# Patient Record
Sex: FEMALE | Race: WHITE
[De-identification: names, ages, dates, MRNs, and addresses within clinical notes are randomized per-mention and may not be internally consistent; named-entity substitution may affect disease eponyms.]

---

## 2018-05-02 ENCOUNTER — HOSPITAL ENCOUNTER (INPATIENT)
Dept: HOSPITAL 56 - MW.OBCHECK | Age: 38
LOS: 4 days | Discharge: HOME | DRG: 540 | End: 2018-05-06
Attending: OBSTETRICS & GYNECOLOGY | Admitting: OBSTETRICS & GYNECOLOGY
Payer: COMMERCIAL

## 2018-05-02 DIAGNOSIS — Z3A.39: ICD-10-CM

## 2018-05-02 DIAGNOSIS — O40.3XX0: Primary | ICD-10-CM

## 2018-05-02 DIAGNOSIS — O09.523: ICD-10-CM

## 2018-05-02 PROCEDURE — 4A1H7CZ MONITORING OF PRODUCTS OF CONCEPTION, CARDIAC RATE, VIA NATURAL OR ARTIFICIAL OPENING: ICD-10-PCS | Performed by: OBSTETRICS & GYNECOLOGY

## 2018-05-02 PROCEDURE — 3E0P7VZ INTRODUCTION OF HORMONE INTO FEMALE REPRODUCTIVE, VIA NATURAL OR ARTIFICIAL OPENING: ICD-10-PCS | Performed by: OBSTETRICS & GYNECOLOGY

## 2018-05-02 RX ADMIN — ONDANSETRON PRN MG: 2 INJECTION, SOLUTION INTRAMUSCULAR; INTRAVENOUS at 23:06

## 2018-05-02 RX ADMIN — MISOPROSTOL PRN MCG: 100 TABLET ORAL at 15:33

## 2018-05-02 RX ADMIN — MISOPROSTOL PRN MCG: 100 TABLET ORAL at 21:30

## 2018-05-02 NOTE — PCM.PREANE
Preanesthetic Assessment





- Anesthesia/Transfusion/Family Hx


Anesthesia History: Prior Anesthesia Without Reaction


Other Type of Anesthesia Reaction Comment: nausea and vomiting


Transfusion History: Prior Transfusion Without Reaction





- Review of Systems


General: No Symptoms


Pulmonary: No Symptoms


Cardiovascular: No Symptoms


Gastrointestinal: No Symptoms


Neurological: No Symptoms


Other: Reports: None





- Physical Assessment


Height: 5 ft 4.17 in


Weight: 88.541 kg


ASA Class: 2


Mental Status: Alert & Oriented x3


Airway Class: Mallampati = 2


Dentition: Reports: Normal Dentition


Thyro-Mental Finger Breadths: 3


Mouth Opening Finger Breadths: 3


ROM/Head Extension: Full


Lungs: Clear to Auscultation, Normal Respiratory Effort


Cardiovascular: Regular Rate, Regular Rhythm





- Lab


Values: 





 Laboratory Last Values











WBC  6.76 K/uL (4.0-11.0)   18  11:17    


 


RBC  3.75 M/uL (4.30-5.90)  L  18  11:17    


 


Hgb  12.0 g/dL (12.0-16.0)   18  11:17    


 


Hct  35.8 % (36.0-46.0)  L  18  11:17    


 


MCV  95.5 fL (80.0-98.0)   18  11:17    


 


MCH  32.0 pg (27.0-32.0)   18  11:17    


 


MCHC  33.5 g/dL (31.0-37.0)   18  11:17    


 


RDW Std Deviation  50.9 fl (28.0-62.0)   18  11:17    


 


RDW Coeff of Nikki  15 % (11.0-15.0)   18  11:17    


 


Plt Count  111 K/uL (150-400)  L  18  11:17    


 


MPV  12.70 fL (7.40-12.00)  H  18  11:17    


 


Nucleated RBC %  0.3 /100WBC  18  11:17    


 


Nucleated RBCs #  0 K/uL  18  11:17    


 


Urine Color  YELLOW   18  13:00    


 


Urine Appearance  CLEAR   18  13:00    


 


Urine pH  7.0  (5.0-8.0)   18  13:00    


 


Ur Specific Gravity  1.020  (1.001-1.035)   18  13:00    


 


Urine Protein  NEGATIVE mg/dL (NEGATIVE)   18  13:00    


 


Urine Glucose (UA)  NEGATIVE mg/dL (NEGATIVE)   18  13:00    


 


Urine Ketones  NEGATIVE mg/dL (NEGATIVE)   18  13:00    


 


Urine Occult Blood  NEGATIVE  (NEGATIVE)   18  13:00    


 


Urine Nitrite  NEGATIVE  (NEGATIVE)   18  13:00    


 


Urine Bilirubin  NEGATIVE  (NEGATIVE)   18  13:00    


 


Urine Urobilinogen  0.2 EU/dL (<2.0)   18  13:00    


 


Ur Leukocyte Esterase  NEGATIVE  (NEGATIVE)   18  13:00    


 


Blood Type  O POSITIVE   18  11:17    


 


Antibody Screen  NEGATIVE   18  11:17    














- Allergies


Allergies/Adverse Reactions: 


 Allergies











Allergy/AdvReac Type Severity Reaction Status Date / Time


 


Penicillins Allergy  Rash Verified 18 17:47














- Acknowledgements


Anesthesia Type Planned: Epidural


Pt an Appropriate Candidate for the Planned Anesthesia: Yes


Alternatives and Risks of Anesthesia Discussed w Pt/Guardian: Yes


Pt/Guardian Understands and Agrees with Anesthesia Plan: Yes





PreAnesthesia Questionnaire


HEENT History: Reports: None


Cardiovascular History: Reports: None


Respiratory History: Reports: None


Gastrointestinal History: Reports: GERD


Genitourinary History: Reports: None


OB/GYN History: Reports: Ectopic Pregnancy, Pregnancy


: 6


Para: 0


LMP (Approximate): Pregnant


Other OB/BYN History: IVF x 2


Musculoskeletal History: Reports: None


Neurological History: Reports: None


Psychiatric History: Reports: Anxiety


Endocrine/Metabolic History: Reports: Obesity/BMI 30+


Other Hematologic History: blood transfusion following ectopic pregnancy


Immunologic History: Reports: None


Oncologic (Cancer) History: Reports: None


Other Dermatologic History: hx I&D for leg trauma





- Infectious Disease History


Infectious Disease History: Reports: Chicken Pox





- Past Surgical History


HEENT Surgical History: Reports: Adenoidectomy, Tonsillectomy


GI Surgical History: Reports: None, Hernia, Inguinal


Female  Surgical History: Reports: D&C, Salpingo-Oophorectomy (Rt Side)


Musculoskeletal Surgical History: Reports: Amputation (traumatic amputation of 

left pinky finger)





- SUBSTANCE USE


Smoking Status *Q: Never Smoker


Tobacco Use Within Last Twelve Months: No


Recreational Drug Use History: No





- HOME MEDS


Home Medications: 


 Home Meds





PNV95/Ferrous Fumarate/FA [Prenatal Multivitamins] 1 tab PO DAILY 07/21/15 [

History]


Aspirin 81 mg PO DAILY 18 [History]


Docusate Sodium [Colace] 100 mg PO BID 18 [History]











- CURRENT (IN HOUSE) MEDS


Current Meds: 





 Current Medications





Butorphanol Tartrate (Stadol)  1 mg IVPUSH Q1H PRN


   PRN Reason: Pain


Carboprost Tromethamine (Hemabate Ds)  250 mcg IM ASDIRECTED PRN


   PRN Reason: Post Partum Hemorrhage


Lactated Ringer's (Ringers, Lactated)  1,000 mls @ 150 mls/hr IV ASDIRECTED [facility]


   Last Admin: 18 11:42 Dose:  150 mls/hr


Tranexamic Acid 1,000 mg/ (Sodium Chloride)  110 mls @ 660 mls/hr IV ONETIME PRN


   PRN Reason: Bleeding


Oxytocin/Sodium Chloride (Oxytocin 30 Unit/500 Ml-Ns)  30 unit in 500 mls @ 2 

mls/hr IV TITRATE [facility]; Protocol


Ampicillin Sodium 1 gm/ Sodium (Chloride)  50 mls @ 100 mls/hr IV Q4H [facility]


   Last Admin: 18 17:00 Dose:  100 mls/hr


Lidocaine HCl (Xylocaine 1%)  50 ml INJECT .ONCE PRN


   PRN Reason: Laceration repair


Methylergonovine Maleate (Methergine)  0.2 mg IM ASDIRECTED PRN


   PRN Reason: Post Partum Hemorrhage


Misoprostol (Cytotec)  200 mcg PO .ONCE PRN


   PRN Reason: Post Partum Hemorrhage


Misoprostol (Cytotec)  25 mcg VAG .ONCE [facility]


   Last Admin: 18 11:32 Dose:  25 mcg


Misoprostol (Cytotec)  25 mcg VAG Q4H PRN


   PRN Reason: Cervical Ripening


   Last Admin: 18 15:33 Dose:  25 mcg


Nalbuphine HCl (Nubain)  10 mg IVPUSH Q1H PRN


   PRN Reason: Pain (severe 7-10)


Sodium Chloride (Saline Flush)  10 ml FLUSH ASDIRECTED PRN


   PRN Reason: Keep Vein Open


Sodium Chloride (Saline Flush)  2.5 ml FLUSH ASDIRECTED PRN


   PRN Reason: Keep Vein Open


Sterile Water (Sterile Water For Irrigation)  1,000 ml IRR ASDIRECTED PRN


   PRN Reason: delivery


Terbutaline Sulfate (Brethine)  0.25 mg SUBCUT ASDIRECTED PRN


   PRN Reason: Tacysystole





Discontinued Medications





Ampicillin Sodium 2 gm/ Sodium (Chloride)  100 mls @ 200 mls/hr IV ONETIME ONE


   Stop: 18 11:28


   Last Admin: 18 11:39 Dose:  200 mls/hr

## 2018-05-03 RX ADMIN — SODIUM CITRATE AND CITRIC ACID MONOHYDRATE ONE ML: 500; 334 SOLUTION ORAL at 19:28

## 2018-05-03 RX ADMIN — ONDANSETRON PRN MG: 2 INJECTION, SOLUTION INTRAMUSCULAR; INTRAVENOUS at 05:21

## 2018-05-03 RX ADMIN — KETOROLAC TROMETHAMINE SCH MG: 30 INJECTION, SOLUTION INTRAMUSCULAR at 21:58

## 2018-05-03 RX ADMIN — SODIUM CITRATE AND CITRIC ACID MONOHYDRATE ONE ML: 500; 334 SOLUTION ORAL at 09:05

## 2018-05-03 NOTE — PCM.OPNOTE
- General Post-Op/Procedure Note


Date of Surgery/Procedure: 05/03/18


Operative Procedure(s): Primary LTCS


Findings: 


viable male, apgars 8 & 9, weight:  3370 g, nuchal cord x 1 reduced, delivery 

of intact 3V cord & placenta.





Pre Op Diagnosis: 39/1 weeks IUP.  IOL for polyhydramnios, AMA.  Arrest of 

dilation


Post-Op Diagnosis: same


Anesthesia Technique: Epidural, General ET Tube


Primary Surgeon: Mireya Guy


Assistant: Lavonne Lee


Fluid Replacement, Intraop: 1,800


Output, Urine Amount: 75


EBL in mLs: 600


Complications: None known


Condition: Stable


Free Text/Narrative:: 


Dictation:  [id number]

## 2018-05-03 NOTE — PCM.PRNOTE
- Free Text/Narrative


Note: 





Anes Note





Patient reports incomplete analgesia.





Patient reports 7 out of 10 pain in the perineum, and last vaginal check was 

uncomfortable.





Patient has delivered a PCA bolus using her epidural.





Tariq Romero CRNA

## 2018-05-03 NOTE — PCM.POSTAN
POST ANESTHESIA ASSESSMENT





- MENTAL STATUS


Mental Status: Alert, Oriented





- VITAL SIGNS


Pulse Rate: 94


SaO2: 97


Resp Rate: 19


Blood Pressure: 123/66


Temperature: 98.4 F





- RESPIRATORY


Respiratory Status: Respiratory Rate WNL, Airway Patent, O2 Saturation Stable





- CARDIOVASCULAR


CV Status: Pulse Rate WNL, Blood Pressure Stable





- GASTROINTESTINAL


GI Status: No Symptoms





- POST OP HYDRATION


Hydration Status: Adequate & Stable





- OBSERVATIONS


Free Text/Narrative:: 





Sleeping when no activity or touch. Vitals strong and pain absent. She 

understands  son is in good condition.

## 2018-05-03 NOTE — PCM.PRNOTE
- Free Text/Narrative


Note: 





Anes Note





Patient reports loss of all anesthesia. 





Epidural cather removed easily and complete.





Epidural replaced with new catheter.





Bet prep X 3.





Sterile Drape applied. Level L2-L3. Midline approach.





Local 1% lido.





Epidural space easily achieved using HUMBERTO technique.





Cath threaded 4 cm with ease. Sterile dressing applied.





Test 3 cc 1.5% lido with epi negative.





Load 10 cc 0.25% bupivicaine in slow incremental doses.





Patient reports excellent analgesia.





Tariq Romero CRNA

## 2018-05-04 RX ADMIN — OXYCODONE HYDROCHLORIDE AND ACETAMINOPHEN PRN TAB: 5; 325 TABLET ORAL at 21:12

## 2018-05-04 RX ADMIN — OXYCODONE HYDROCHLORIDE AND ACETAMINOPHEN PRN TAB: 5; 325 TABLET ORAL at 00:51

## 2018-05-04 RX ADMIN — OXYCODONE HYDROCHLORIDE AND ACETAMINOPHEN PRN TAB: 5; 325 TABLET ORAL at 16:12

## 2018-05-04 RX ADMIN — OXYCODONE HYDROCHLORIDE AND ACETAMINOPHEN PRN TAB: 5; 325 TABLET ORAL at 05:22

## 2018-05-04 RX ADMIN — KETOROLAC TROMETHAMINE SCH MG: 30 INJECTION, SOLUTION INTRAMUSCULAR at 10:32

## 2018-05-04 RX ADMIN — OXYCODONE HYDROCHLORIDE AND ACETAMINOPHEN PRN TAB: 5; 325 TABLET ORAL at 11:49

## 2018-05-04 RX ADMIN — KETOROLAC TROMETHAMINE SCH MG: 30 INJECTION, SOLUTION INTRAMUSCULAR at 22:35

## 2018-05-04 RX ADMIN — KETOROLAC TROMETHAMINE SCH MG: 30 INJECTION, SOLUTION INTRAMUSCULAR at 04:44

## 2018-05-04 RX ADMIN — KETOROLAC TROMETHAMINE SCH MG: 30 INJECTION, SOLUTION INTRAMUSCULAR at 16:48

## 2018-05-04 NOTE — PCM.PNPP
- General Info


Date of Service: 18


Functional Status: Reports: Pain Controlled, Tolerating Diet, Ambulating





- Review of Systems


General: Denies: Fever


Pulmonary: Reports: Cough (stable, receive zithromax earlier this week).  Denies

: Shortness of Breath


Cardiovascular: Denies: Chest Pain, Palpitations, Lightheadedness


Gastrointestinal: Reports: Flatus.  Denies: Nausea


Genitourinary: Denies: Flank Pain


Neurological: Reports: No Symptoms


Psychiatric: Reports: No Symptoms





- General Info


Date of Service: 18





- Patient Data


Vital Signs - Most Recent: 


 Last Vital Signs











Temp  37.0 C   18 03:00


 


Pulse  79   18 03:00


 


Resp  16   18 04:00


 


BP  122/78   18 03:00


 


Pulse Ox  97   18 04:00











Weight - Most Recent: 88.541 kg


I&O - Last 24 Hours: 


 Intake & Output











 18





 14:59 22:59 06:59


 


Intake Total  4000 


 


Output Total  270 


 


Balance  3730 











Lab Results - Last 24 Hours: 


 Laboratory Results - last 24 hr











  18 Range/Units





  19:50 


 


Cord ABG pH  7.305  (7.18-7.38)  


 


Cord ABG Base Excess  -5  (-10--2)  


 


Cord VBG pH  7.331  (7.25-7.45)  


 


Cord VBG Base Excess  -6  (-10--2)  











Med Orders - Current: 


 Current Medications





Al Hydroxide/Mg Hydroxide (Mag-Al Plus)  30 ml PO Q8H PRN


   PRN Reason: Heartburn


Bisacodyl (Dulcolax)  10 mg RECTAL .ONCE PRN


   PRN Reason: Constipation


Carboprost Tromethamine (Hemabate Ds)  250 mcg IM ASDIRECTED PRN


   PRN Reason: Post Partum Hemorrhage


Diphenhydramine HCl (Benadryl)  25 mg IVPUSH Q6H PRN


   PRN Reason: Itching or Nausea


Docusate Sodium (Colace)  100 mg PO BID [facility]


Emollient Ointment (Lansinoh Hpa)  0 gm TOP ASDIRECTED PRN


   PRN Reason: Sore Nipples


Furosemide (Lasix)  20 mg PO DAILY [facility]


Lactated Ringer's (Ringers, Lactated)  1,000 mls @ 150 mls/hr IV ASDIRECTED [facility]


   Last Admin: 18 18:40 Dose:  150 mls/hr


Tranexamic Acid 1,000 mg/ (Sodium Chloride)  110 mls @ 660 mls/hr IV ONETIME PRN


   PRN Reason: Bleeding


Oxytocin/Sodium Chloride (Oxytocin 30 Unit/500 Ml-Ns)  30 unit in 500 mls @ 2 

mls/hr IV TITRATE [facility]; Protocol


   Last Titration: 18 17:26 Dose:  0 munits/min, 0 mls/hr


Lactated Ringer's (Ringers, Lactated)  1,000 mls @ 125 mls/hr IV ASDIRECTED [facility]


   Last Admin: 18 22:02 Dose:  125 mls/hr


Ibuprofen (Motrin)  800 mg PO Q8H PRN


   PRN Reason: mild pain or fever


Ketorolac Tromethamine (Toradol)  30 mg IVPUSH Q6H [facility]


   Stop: 18 22:01


   Last Admin: 18 04:44 Dose:  30 mg


Methylergonovine Maleate (Methergine)  0.2 mg IM ASDIRECTED PRN


   PRN Reason: Post Partum Hemorrhage


Misoprostol (Cytotec)  200 mcg PO .ONCE PRN


   PRN Reason: Post Partum Hemorrhage


Nalbuphine HCl (Nubain)  10 mg IVPUSH Q3H PRN


   PRN Reason: Pruritis


   Stop: 18 20:51


Naloxone HCl (Narcan)  0.1 mg IVPUSH ONETIME PRN


   PRN Reason: Respiratory Depression


   Stop: 18 20:51


Ondansetron HCl (Zofran)  4 mg IVPUSH Q6H PRN


   PRN Reason: Nausea/Vomiting


   Last Admin: 18 05:21 Dose:  4 mg


Ondansetron HCl (Zofran)  4 mg IVPUSH Q4H PRN


   PRN Reason: Nausea/Vomiting


Oxycodone/Acetaminophen (Percocet 325-5 Mg)  1 tab PO Q4H PRN


   PRN Reason: Pain (moderate 4-6)


   Last Admin: 18 00:51 Dose:  1 tab


Oxycodone/Acetaminophen (Percocet 325-5 Mg)  2 tab PO Q4H PRN


   PRN Reason: Pain (moderate 4-6)


Promethazine HCl (Phenergan)  25 mg IM Q6H PRN


   PRN Reason: Nausea


   Last Admin: 18 08:42 Dose:  25 mg


Simethicone (Simethicone)  80 mg PO Q4H PRN


   PRN Reason: Gas


Sodium Chloride (Saline Flush)  10 ml FLUSH ASDIRECTED PRN


   PRN Reason: Keep Vein Open


Sodium Chloride (Saline Flush)  2.5 ml FLUSH ASDIRECTED PRN


   PRN Reason: Keep Vein Open


Terbutaline Sulfate (Brethine)  0.25 mg SUBCUT ASDIRECTED PRN


   PRN Reason: Tacysystole


   Last Admin: 18 19:14 Dose:  0.25 mg





Discontinued Medications





Bupivacaine HCl (Sensorcaine-Mpf 0.5%) Confirm Administered Dose 10 ml .ROUTE 

.STK-MED ONE


   Stop: 18 08:10


Bupivacaine HCl (Sensorcaine-Mpf 0.5%) Confirm Administered Dose 10 ml .ROUTE 

.STK-MED ONE


   Stop: 18 11:47


Bupivacaine HCl (Sensorcaine-Mpf 0.25%) Confirm Administered Dose 10 ml .ROUTE 

.STK-MED ONE


   Stop: 18 16:20


Butorphanol Tartrate (Stadol)  1 mg IVPUSH Q1H PRN


   PRN Reason: Pain


   Last Admin: 18 16:06 Dose:  1 mg


Cefazolin Sodium (Ancef) Confirm Administered Dose 2 gm .ROUTE .STK-MED ONE


   Stop: 18 19:25


Citric Acid/Sodium Citrate (Bicitra Solution)  30 ml PO ONETIME ONE


   Stop: 18 08:49


   Last Admin: 18 19:28 Dose:  30 ml


Citric Acid/Sodium Citrate (Bicitra Solution) Confirm Administered Dose 30 ml 

.ROUTE .STK-MED ONE


   Stop: 18 19:28


Fentanyl (Sublimaze) Confirm Administered Dose 100 mcg .ROUTE .STK-MED ONE


   Stop: 18 13:22


Fentanyl (Sublimaze) Confirm Administered Dose 100 mcg .ROUTE .STK-MED ONE


   Stop: 18 13:38


Fentanyl (Sublimaze) Confirm Administered Dose 100 mcg .ROUTE .STK-MED ONE


   Stop: 18 19:24


Ampicillin Sodium 2 gm/ Sodium (Chloride)  100 mls @ 200 mls/hr IV ONETIME ONE


   Stop: 18 11:28


   Last Admin: 18 11:39 Dose:  200 mls/hr


Ampicillin Sodium 1 gm/ Sodium (Chloride)  50 mls @ 100 mls/hr IV Q4H [facility]


   Last Admin: 18 17:20 Dose:  100 mls/hr


Fentanyl/Bupivacaine HCl (Fentanyl-Bupiv-Ns 2 Mcg/Ml-0.125%) Confirm 

Administered Dose 100 mls @ as directed EP .STK-MED ONE


   Stop: 18 22:00


Fentanyl/Bupivacaine HCl (Fentanyl-Bupiv-Ns 2 Mcg/Ml-0.125%) Confirm 

Administered Dose 100 mls @ as directed EP .STK-MED ONE


   Stop: 18 06:51


Fentanyl/Bupivacaine HCl (Fentanyl-Bupiv-Ns 2 Mcg/Ml-0.125%) Confirm 

Administered Dose 100 mls @ as directed EP .STK-MED ONE


   Stop: 18 12:01


Bupivacaine HCl/Epinephrine Bitart (Sensorc Mpf 0.25%-Epi 1:[id number]) Confirm 

Administered Dose 30 mls @ as directed .ROUTE .STK-MED ONE


   Stop: 18 20:15


Lidocaine HCl (Xylocaine 1%)  50 ml INJECT .ONCE PRN


   PRN Reason: Laceration repair


Misoprostol (Cytotec)  25 mcg VAG .ONCE [facility]


   Last Admin: 18 11:32 Dose:  25 mcg


Misoprostol (Cytotec)  25 mcg VAG Q4H PRN


   PRN Reason: Cervical Ripening


   Last Admin: 18 21:30 Dose:  25 mcg


Morphine Sulfate (Duramorph Pf) Confirm Administered Dose 1 mg .ROUTE .STK-MED 

ONE


   Stop: 18 19:24


Nalbuphine HCl (Nubain)  10 mg IVPUSH Q1H PRN


   PRN Reason: Pain (severe 7-10)


Ondansetron HCl (Zofran) Confirm Administered Dose 4 mg .ROUTE .STK-MED ONE


   Stop: 18 19:37


Oxytocin (Pitocin) Confirm Administered Dose 20 unit .ROUTE .STK-MED ONE


   Stop: 18 19:37


Phenylephrine HCl (Phenylephrine In Ns 100 Mcg/Ml) Confirm Administered Dose 1 

mg .ROUTE .STK-MED ONE


   Stop: 18 19:37


Propofol (Diprivan  20 Ml) Confirm Administered Dose 200 mg .ROUTE .STK-MED ONE


   Stop: 18 19:38


Ropivacaine (Naropin 0.2%) Confirm Administered Dose 20 ml .ROUTE .STK-MED ONE


   Stop: 18 13:22


Sterile Water (Sterile Water For Irrigation)  1,000 ml IRR ASDIRECTED PRN


   PRN Reason: delivery


Succinylcholine Chloride (Quelicin) Confirm Administered Dose 200 mg .ROUTE .STK

-MED ONE


   Stop: 18 20:07











- Infant Interaction


Infant Disposition, Postpartum: [place] in Room with Family


Infant Feeding:  Infant; Nursed Well


Support Person: 





- Postpartum Recovery Exam


Fundal Tone: Firm


Fundal Level: At Umbilicus


Fundal Placement: Midline


Lochia Amount: Scant


Lochia Color: Rubra/Red


Bladder Status: Indwelling Catheter in Place





- Exam


General: Alert, Oriented


Lungs: Normal Respiratory Effort


Cardiovascular: Regular Rate, Regular Rhythm


GI/Abdominal Exam: Normal Bowel Sounds, Soft


Extremities: Pedal Edema (3+).  No: Armida's Sign


Skin: Warm, Dry, Intact


Wound/Incisions: Dressing Dry and Intact


Psy/Mental Status: Alert, Normal Affect





- Problem List & Annotations


(1)  delivery, delivered, current hospitalization


SNOMED Code(s): 292421442


   Code(s): O82 - ENCOUNTER FOR  DELIVERY WITHOUT INDICATION   Status: 

Acute   Current Visit: Yes   





- Problem List Review


Problem List Initiated/Reviewed/Updated: Yes





- My Orders


Last 24 Hours: 


My Active Orders





18 08:18


Promethazine [Phenergan]   25 mg IM Q6H PRN 





18 20:33


Notify Provider Intake and Out [RC] ASDIRECTED 


Notify Provider Vital Signs [RC] ASDIRECTED 


Acetaminophen/oxyCODONE [Percocet 325-5 MG]   1 tab PO Q4H PRN 


Acetaminophen/oxyCODONE [Percocet 325-5 MG]   2 tab PO Q4H PRN 


Alum Hydrox/Mag Hydrox/Simeth [Mag-Al Plus]   30 ml PO Q8H PRN 


Bisacodyl [Dulcolax]   10 mg RECTAL .ONCE PRN 


Lanolin [Lansinoh HPA]   See Dose Instructions  TOP ASDIRECTED PRN 


Ondansetron [Zofran]   4 mg IVPUSH Q4H PRN 


Simethicone   80 mg PO Q4H PRN 


diphenhydrAMINE [Benadryl]   25 mg IVPUSH Q6H PRN 


Abdominal Binder [OM.PC] Routine 


Heat Therapy [OM.PC] Routine 


Ice Therapy [OM.PC] Routine 





18 20:34


Patient Status [ADT] Routine 


Ambulate [RC] PER UNIT ROUTINE 


Communication Order [RC] PER UNIT ROUTINE 


May Shower [RC] ASDIRECTED 


RT Incentive Spirometry [RC] Q2HWA 


Vital Signs [RC] PER UNIT ROUTINE 


Assess Lochia [WOMSER] Per Unit Routine 


Assess Uterine Involution [WOMSER] Per Unit Routine 


Breast Pump [WOMSER] Per Unit Routine 


Peripheral IV Discontinue [OM.PC] Routine 


Sequential Compression Device [OM.PC] Per Unit Routine 





18 20:45


Lactated Ringers [Ringers, Lactated] 1,000 ml IV ASDIRECTED 





18 21:00


Docusate Sodium [Colace]   100 mg PO BID 





18 22:00


Ketorolac [Toradol]   30 mg IVPUSH Q6H 





18 Dinner


Regular Diet [DIET] 





18 05:11


HEMOGLOBIN/HEMATOCRIT,HH [HEME] Routine 





18 09:00


Furosemide [Lasix]   20 mg PO DAILY 





18 04:00


Ibuprofen [Motrin]   800 mg PO Q8H PRN 














- Assessment


Assessment:: 





POD 1 status post Primary LTCS





- Plan


Plan:: 





Doing well overall, ambulating halls. Pain is controlled.  Given extensive 

edema will diurese today and tomorrow with oral lasix.  Breastfeeding going 

well overall.  Continue postoperative cares.  VS are stable.

## 2018-05-04 NOTE — [id number]
Post Anesthesia Note





- EVALUATION WITHIN 48HRS OF ANESTHETIC


Vital Signs in Normal Range: Yes


Patient Participated in Evaluation: Yes


Respiratory Function Stable: Yes


Airway Patent: Yes


Cardiovascular Function Stable: Yes


Hydration Status Stable: Yes


Pain Control Satisfactory: Yes


Nausea and Vomiting Control Satisfactory: Yes


Mental Status Recovered: Yes


Pulse Rate: 94


Resp Rate: 15


Temperature: 98.4 F


Blood Pressure: 123/66





- COMMENTS/OBSERVATIONS


Free Text/Narrative:: 





Did use percocet once overnight and at 0930 today. Happy with 

outcome...discussed her emotional state and the disconnect with effect of local 

anesthetic.

## 2018-05-04 NOTE — OR
SURGEON:

Mireya Guy M.D.

 

DATE OF PROCEDURE:  2018

 

PREOPERATIVE DIAGNOSES:

1. A 39 and 1 weeks intrauterine pregnancy.

2. Induction of labor for polyhydramnios, advanced maternal age.

3. Arrest of dilation.

 

POSTOPERATIVE DIAGNOSES:

1. A 39 and 1 weeks intrauterine pregnancy.

2. Induction of labor for polyhydramnios, advanced maternal age.

3. Arrest of dilation.

 

PROCEDURES PERFORMED:

Primary low transverse  section via Pfannenstiel skin incision.

 

ANESTHESIA:

Epidural with general endotracheal anesthesia.

 

ASSISTANT:

DENVER Pickard.

 

ESTIMATED BLOOD LOSS:

600 mL.

 

FLUID:

800 mL crystalloid in OR.

 

URINE OUTPUT:

75 mL.

 

COMPLICATIONS:

None.

 

FINDINGS:

Term male, Apgar scores 8 at 1 minute, 9 at 5 minutes.  Weight of 3370 g,

intact placenta, three-vessel cord.  Nuchal cord x1 reduced manually.  Clear

amniotic fluid.  Normal-appearing pelvis.

 

DISPOSITION:

Infant to  nursery, mom in PACU, stable.

 

PROCEDURE IN DETAIL:

Catalina is a 37-year-old, G6, P0-0-5-0 at 39 and 1 weeks gestational age, who

presented yesterday for scheduled induction of labor, due to term gestation,

polyhydramnios, and advanced maternal age.  The patient underwent Cytotec dosing

and had nice cervical change with this from 1 cm thick to 3 cm, 70% by the

following day.  She had spontaneous rupture of membranes at approximately 7:00

a.m. on the morning of 2018 with clear fluid.  She had been receiving

ampicillin prophylaxis for group B beta strep positive status since she

presented and was admitted the day before.

 

The patient became increasingly uncomfortable and underwent regional anesthesia

in the form of epidural.  This worked satisfactorily initially but began having

discomfort and difficulty with pain control in the morning hours.  She had re-

boluses and re-dosing, repositioning performed.  Ultimately underwent another

epidural shortly after 1:00 p.m.  She once again became comfortable.  Fetal

heart tones remained in the 130s with variability.  Occasional variable.  The

patient now made cervical change to approximately 5 to 6 cm dilatation, 80%

effaced, -2 station.

 

Throughout the afternoon, however, the patient began having increasing

discomfort even with the second epidural in place.  Ultimately, she actually

underwent a third epidural placement at approximately 4:30 p.m.  Once again

became comfortable for short interval and then became increasingly

uncomfortable, pain of 9/10.  She was no longer able to tolerate Pitocin.  This

was discontinued, and with observation, she made no further cervical change from

6 cm dilatation.  Options at this juncture discussed extensively with Catalina 
and her

family.  She is not having good results from regional anesthesia.  She really is

not willing to undergo labor efforts and delivery without regional anesthesia.

She would like to proceed with a  at this juncture.  She has not made

any cervical change throughout the afternoon.

 

Discussed the case with Anesthesia.  Think it is most prudent to undergo general

anesthesia given the multiple regional anesthesia that have not been effective

for her pain control.

 

They are in agreement with this.  The patient did receive a dose of terbutaline

and Stadol to help with her pain control prior to going to .  The risks

of the  have been discussed with her.  Proper consent obtained.

 

The patient was taken to the operating room, where she was placed in the dorsal

supine position with leftward tilt.  SCDs to the lower extremities.  Stanley to

gravity.  She was prepped and draped in the usual sterile fashion and received

Ancef 2 g prophylactically.  Once the pediatric team was in place, the OR crew

was ready.  The patient underwent general anesthesia with intubation.  At this

juncture, created a Pfannenstiel skin incision, carried down to the level of the

rectus fascia, which was incised in midline, lateralized on either side sharply

and bluntly.  The superior aspect of fascia was tented upward, dissected sharply

and bluntly from underlying muscles.  In a similar fashion, this was performed

with the inferior aspect of fascia.  Rectus muscles were  in midline,

and peritoneum was entered.  Rectus muscles and peritoneum were lateralized

bluntly.  Self-retaining retractor gently placed.  Uterovesical reflection was

visualized.  Bladder flap was created sharply and bluntly.  Bladder was

mobilized away from lower uterine segment.  Low transverse hysterotomy was

performed.  Uterine cavities were entered bluntly with the scalpel.  The

hysterotomy was lateralized bluntly.  The clear fluid was still found to be

present.  The infant's head was flexed and delivered from the pelvis.  The

infant's head was delivered followed by anterior shoulder, posterior shoulder,

and remaining body without difficulty.  Nuchal cord x1 was reduced manually.

The infant's oropharynx and nares bulb suctioned.  Actually, the infant began

crying and cord clamped x2 and cut.  Infant was handed off to attending

pediatrician, Dr. Castillo.

 

Cord arterial, cord venous, cord blood samples were obtained.  The placenta was

now delivered.  Uterine cavity was cleared of all clot and debris.  Hysterotomy

repaired using 0 Vicryl in continuous locked fashion followed by a re-

imbricating layer.  Uterus was involuting nicely.  Upon inspection of the

hysterotomy, there was an area of oozing in the midline, repaired with figure-of
-

eight suture.  Hemostasis thereafter evident.

 

Posterior aspect of the uterus inspected.  No defects or hematomas were found to

be forming.  The region was well irrigated and suction dried.  Colonic gutters

were cleared of all clot and debris, well irrigated, suction dried.  The self-

retaining retractor gently removed.  Bladder blade was placed.  Hysterotomy was

once again inspected and found to be hemostatic.

 

The bladder blade was removed.  Rectus muscles reapproximated as well as

peritoneum with 0 Vicryl inverted mattress suture technique.  Anterior aspect of

the muscle, posterior aspect of the fascia closely inspected.  Any areas of

oozing were cauterized.  The rectus fascia was now reapproximated using 0 Vicryl

in continuous running fashion beginning laterally on either side, meeting in the

midline.  Subcutaneous tissue was well irrigated, suction dried.  Any areas of

oozing were cauterized.  There was an area of persistent bleeding along the

fascia that was repaired with figure-of-eight suture but thereafter was

hemostatic.

 

In order to help with postop pain control, the patient has had Duramorph bolused

in her epidural and also injected 20 mL of 0.25% Marcaine with epinephrine along

the incision in the fascia line.  The skin edges were now reapproximated using 3
-

0 Vicryl in a Moses needle in a subcuticular fashion and re-imbricated with 0.5

inch Steri-Strips and Mastisol.  Uterus remained firm.  Sponge, instrument, and

needle counts were correct x3.  The patient had tolerated the procedure well.

She was extubated and will be taken to PACU in stable condition.  Infant to

 nursery.

 

 

JAYESH / MEG

DD:  2018 20:46:21

DT:  2018 00:14:24

Job #:  [id number]

ELIEZER

## 2018-05-05 LAB
CHLORIDE SERPL-SCNC: 110 MMOL/L (ref 98–107)
CHLORIDE SERPL-SCNC: 111 MMOL/L (ref 98–107)
SODIUM SERPL-SCNC: 142 MMOL/L (ref 136–145)
SODIUM SERPL-SCNC: 143 MMOL/L (ref 136–145)

## 2018-05-05 RX ADMIN — OXYCODONE HYDROCHLORIDE AND ACETAMINOPHEN PRN TAB: 5; 325 TABLET ORAL at 08:43

## 2018-05-05 RX ADMIN — OXYCODONE HYDROCHLORIDE AND ACETAMINOPHEN PRN TAB: 5; 325 TABLET ORAL at 03:03

## 2018-05-05 RX ADMIN — OXYCODONE HYDROCHLORIDE AND ACETAMINOPHEN PRN TAB: 5; 325 TABLET ORAL at 20:19

## 2018-05-05 RX ADMIN — OXYCODONE HYDROCHLORIDE AND ACETAMINOPHEN PRN TAB: 5; 325 TABLET ORAL at 15:01

## 2018-05-05 RX ADMIN — ONDANSETRON PRN MG: 2 INJECTION, SOLUTION INTRAMUSCULAR; INTRAVENOUS at 07:03

## 2018-05-05 NOTE — PCM.PNPP
- General Info


Date of Service: 18


Admission Dx/Problem (Free Text): 





38 yo P1 s/p Primary LTCS PPD 2


Subjective Update: 


Patient seen at bedside , she complains of bilateral leg swelling , non tender. 

Normal lochia , She is breastfeeding.


She was given IV lacix last night. Normal urine output noted > 30cc in 1hr 





Functional Status: Reports: Pain Controlled, Tolerating Diet, Ambulating, 

Urinating





- Review of Systems


General: Reports: No Symptoms


HEENT: Reports: No Symptoms


Pulmonary: Reports: No Symptoms


Cardiovascular: Reports: No Symptoms


Gastrointestinal: Reports: No Symptoms


Genitourinary: Reports: No Symptoms


Musculoskeletal: Reports: No Symptoms


Skin: Reports: No Symptoms


Neurological: Reports: No Symptoms


Psychiatric: Reports: No Symptoms





- General Info


Date of Service: 18





- Patient Data


Vital Signs - Most Recent: 


 Last Vital Signs











Temp  36.6 C   18 07:51


 


Pulse  91   18 07:51


 


Resp  15   18 07:51


 


BP  127/82   18 07:51


 


Pulse Ox  95   18 07:51











Weight - Most Recent: 92.249 kg


I&O - Last 24 Hours: 


 Intake & Output











 18





 22:59 06:59 14:59


 


Intake Total 1200 900 


 


Output Total 2350 450 450


 


Balance -1150 450 -450











Med Orders - Current: 


 Current Medications





Al Hydroxide/Mg Hydroxide (Mag-Al Plus)  30 ml PO Q8H PRN


   PRN Reason: Heartburn


Bisacodyl (Dulcolax)  10 mg RECTAL .ONCE PRN


   PRN Reason: Constipation


Carboprost Tromethamine (Hemabate Ds)  250 mcg IM ASDIRECTED PRN


   PRN Reason: Post Partum Hemorrhage


Diphenhydramine HCl (Benadryl)  25 mg IVPUSH Q6H PRN


   PRN Reason: Itching or Nausea


Docusate Sodium (Colace)  100 mg PO BID [facility]


   Last Admin: 18 08:43 Dose:  100 mg


Emollient Ointment (Lansinoh Hpa)  0 gm TOP ASDIRECTED PRN


   PRN Reason: Sore Nipples


Furosemide (Lasix)  20 mg PO DAILY [facility]


   Last Admin: 18 08:43 Dose:  20 mg


Lactated Ringer's (Ringers, Lactated)  1,000 mls @ 150 mls/hr IV ASDIRECTED MICKEY


   Last Admin: 18 18:40 Dose:  150 mls/hr


Tranexamic Acid 1,000 mg/ (Sodium Chloride)  110 mls @ 660 mls/hr IV ONETIME PRN


   PRN Reason: Bleeding


Oxytocin/Sodium Chloride (Oxytocin 30 Unit/500 Ml-Ns)  30 unit in 500 mls @ 2 

mls/hr IV TITRATE MICKEY; Protocol


   Last Titration: 18 17:26 Dose:  0 munits/min, 0 mls/hr


Lactated Ringer's (Ringers, Lactated)  1,000 mls @ 125 mls/hr IV ASDIRECTED MICKEY


   Last Admin: 18 22:02 Dose:  125 mls/hr


Ibuprofen (Motrin)  800 mg PO Q8H PRN


   PRN Reason: mild pain or fever


Methylergonovine Maleate (Methergine)  0.2 mg IM ASDIRECTED PRN


   PRN Reason: Post Partum Hemorrhage


Misoprostol (Cytotec)  200 mcg PO .ONCE PRN


   PRN Reason: Post Partum Hemorrhage


Ondansetron HCl (Zofran)  4 mg IVPUSH Q6H PRN


   PRN Reason: Nausea/Vomiting


   Last Admin: 18 07:03 Dose:  4 mg


Ondansetron HCl (Zofran)  4 mg IVPUSH Q4H PRN


   PRN Reason: Nausea/Vomiting


Oxycodone/Acetaminophen (Percocet 325-5 Mg)  1 tab PO Q4H PRN


   PRN Reason: Pain (moderate 4-6)


   Last Admin: 18 08:43 Dose:  1 tab


Oxycodone/Acetaminophen (Percocet 325-5 Mg)  2 tab PO Q4H PRN


   PRN Reason: Pain (moderate 4-6)


Promethazine HCl (Phenergan)  25 mg IM Q6H PRN


   PRN Reason: Nausea


   Last Admin: 18 08:42 Dose:  25 mg


Simethicone (Simethicone)  80 mg PO Q4H PRN


   PRN Reason: Gas


Sodium Chloride (Saline Flush)  10 ml FLUSH ASDIRECTED PRN


   PRN Reason: Keep Vein Open


Sodium Chloride (Saline Flush)  2.5 ml FLUSH ASDIRECTED PRN


   PRN Reason: Keep Vein Open


Terbutaline Sulfate (Brethine)  0.25 mg SUBCUT ASDIRECTED PRN


   PRN Reason: Tacysystole


   Last Admin: 18 19:14 Dose:  0.25 mg





Discontinued Medications





Bupivacaine HCl (Sensorcaine-Mpf 0.5%) Confirm Administered Dose 10 ml .ROUTE 

.STK-MED ONE


   Stop: 18 08:10


Bupivacaine HCl (Sensorcaine-Mpf 0.5%) Confirm Administered Dose 10 ml .ROUTE 

.STK-MED ONE


   Stop: 18 11:47


Bupivacaine HCl (Sensorcaine-Mpf 0.25%) Confirm Administered Dose 10 ml .ROUTE 

.STK-MED ONE


   Stop: 18 16:20


Butorphanol Tartrate (Stadol)  1 mg IVPUSH Q1H PRN


   PRN Reason: Pain


   Last Admin: 18 16:06 Dose:  1 mg


Cefazolin Sodium (Ancef) Confirm Administered Dose 2 gm .ROUTE .STK-MED ONE


   Stop: 18 19:25


Citric Acid/Sodium Citrate (Bicitra Solution)  30 ml PO ONETIME ONE


   Stop: 18 08:49


   Last Admin: 18 19:28 Dose:  30 ml


Citric Acid/Sodium Citrate (Bicitra Solution) Confirm Administered Dose 30 ml 

.ROUTE .STK-MED ONE


   Stop: 18 19:28


Fentanyl (Sublimaze) Confirm Administered Dose 100 mcg .ROUTE .STK-MED ONE


   Stop: 18 13:22


Fentanyl (Sublimaze) Confirm Administered Dose 100 mcg .ROUTE .STK-MED ONE


   Stop: 18 13:38


Fentanyl (Sublimaze) Confirm Administered Dose 100 mcg .ROUTE .STK-MED ONE


   Stop: 18 19:24


Furosemide (Lasix)  10 mg IVPUSH NOW ONE


   Stop: 18 20:44


   Last Admin: 18 21:16 Dose:  10 mg


Ampicillin Sodium 2 gm/ Sodium (Chloride)  100 mls @ 200 mls/hr IV ONETIME ONE


   Stop: 18 11:28


   Last Admin: 18 11:39 Dose:  200 mls/hr


Ampicillin Sodium 1 gm/ Sodium (Chloride)  50 mls @ 100 mls/hr IV Q4H [facility]


   Last Admin: 18 17:20 Dose:  100 mls/hr


Fentanyl/Bupivacaine HCl (Fentanyl-Bupiv-Ns 2 Mcg/Ml-0.125%) Confirm 

Administered Dose 100 mls @ as directed EP .STK-MED ONE


   Stop: 18 22:00


Fentanyl/Bupivacaine HCl (Fentanyl-Bupiv-Ns 2 Mcg/Ml-0.125%) Confirm 

Administered Dose 100 mls @ as directed EP .STK-MED ONE


   Stop: 18 06:51


Fentanyl/Bupivacaine HCl (Fentanyl-Bupiv-Ns 2 Mcg/Ml-0.125%) Confirm 

Administered Dose 100 mls @ as directed EP .STK-MED ONE


   Stop: 18 12:01


Bupivacaine HCl/Epinephrine Bitart (Sensorc Mpf 0.25%-Epi 1:[id number]) Confirm 

Administered Dose 30 mls @ as directed .ROUTE .STK-MED ONE


   Stop: 18 20:15


Ketorolac Tromethamine (Toradol)  30 mg IVPUSH Q6H [facility]


   Stop: 18 22:01


   Last Admin: 18 22:35 Dose:  30 mg


Lidocaine HCl (Xylocaine 1%)  50 ml INJECT .ONCE PRN


   PRN Reason: Laceration repair


Misoprostol (Cytotec)  25 mcg VAG .ONCE [facility]


   Last Admin: 18 11:32 Dose:  25 mcg


Misoprostol (Cytotec)  25 mcg VAG Q4H PRN


   PRN Reason: Cervical Ripening


   Last Admin: 18 21:30 Dose:  25 mcg


Morphine Sulfate (Duramorph Pf) Confirm Administered Dose 1 mg .ROUTE .STK-MED 

ONE


   Stop: 18 19:24


Nalbuphine HCl (Nubain)  10 mg IVPUSH Q1H PRN


   PRN Reason: Pain (severe 7-10)


Nalbuphine HCl (Nubain)  10 mg IVPUSH Q3H PRN


   PRN Reason: Pruritis


   Stop: 18 20:51


Naloxone HCl (Narcan)  0.1 mg IVPUSH ONETIME PRN


   PRN Reason: Respiratory Depression


   Stop: 18 20:51


Ondansetron HCl (Zofran) Confirm Administered Dose 4 mg .ROUTE .STK-MED ONE


   Stop: 18 19:37


Oxytocin (Pitocin) Confirm Administered Dose 20 unit .ROUTE .STK-MED ONE


   Stop: 18 19:37


Phenylephrine HCl (Phenylephrine In Ns 100 Mcg/Ml) Confirm Administered Dose 1 

mg .ROUTE .STK-MED ONE


   Stop: 18 19:37


Propofol (Diprivan  20 Ml) Confirm Administered Dose 200 mg .ROUTE .STK-MED ONE


   Stop: 18 19:38


Ropivacaine (Naropin 0.2%) Confirm Administered Dose 20 ml .ROUTE .STK-MED ONE


   Stop: 18 13:22


Sterile Water (Sterile Water For Irrigation)  1,000 ml IRR ASDIRECTED PRN


   PRN Reason: delivery


Succinylcholine Chloride (Quelicin) Confirm Administered Dose 200 mg .ROUTE .STK

-MED ONE


   Stop: 18 20:07











- Infant Interaction


Infant Disposition, Postpartum:  in Room with Family


Infant Feeding:  Infant; Nursed Well


Support Person: 





- Postpartum Recovery Exam


Fundal Tone: Firm


Fundal Level: At Umbilicus


Fundal Placement: Midline


Lochia Amount: Scant


Lochia Color: Rubra/Red


Perineum Description: Intact, Minimal Bruising/Swelling


Episiotomy/Laceration: None


Bladder Status: Voiding


Urinary Elimination: Voided





- Exam


General: Alert, Oriented


HEENT: Pupils Equal


Neck: Supple


Lungs: Clear to Auscultation


Cardiovascular: Regular Rate, Regular Rhythm


GI/Abdominal Exam: Normal Bowel Sounds (Pfannestiel skin incision c/d/i )


Extremities: Normal Inspection


Skin: Warm


Wound/Incisions: Healing Well


Neurological: No New Focal Deficit


Psy/Mental Status: Alert, Normal Affect





- Problem List & Annotations


(1)  delivery, delivered, current hospitalization


SNOMED Code(s): 318761434


   Code(s): O82 - ENCOUNTER FOR  DELIVERY WITHOUT INDICATION   Status: 

Acute   Current Visit: Yes   





- Problem List Review


Problem List Initiated/Reviewed/Updated: Yes





- My Orders


Last 24 Hours: 


My Active Orders





18 08:14


BMP [BASIC METABOLIC PANEL,BMP] [CHEM] Routine 














- Assessment


Assessment:: 





POD 2 status post Primary LTCS





- Plan


Plan:: 


IV lasix 10mg today


Compression stocking


Elevate legs


BMP


Will defer discharge till tomorrow


Routine postpartum care 


Venodynes

## 2018-05-06 VITALS — SYSTOLIC BLOOD PRESSURE: 115 MMHG | DIASTOLIC BLOOD PRESSURE: 89 MMHG

## 2018-05-06 LAB
CHLORIDE SERPL-SCNC: 110 MMOL/L (ref 98–107)
SODIUM SERPL-SCNC: 143 MMOL/L (ref 136–145)

## 2018-05-06 RX ADMIN — OXYCODONE HYDROCHLORIDE AND ACETAMINOPHEN PRN TAB: 5; 325 TABLET ORAL at 00:27

## 2018-05-06 RX ADMIN — OXYCODONE HYDROCHLORIDE AND ACETAMINOPHEN PRN TAB: 5; 325 TABLET ORAL at 09:35

## 2018-05-06 NOTE — PCM.PNPP
- General Info


Date of Service: 18


Admission Dx/Problem (Free Text): 





36 yo P1 s/p Primary LTCS P0D 3 with fluid retention improved , had elevated 

creatinine level,. now normal 


Subjective Update: 


Patient seen at bedside , bilateral leg swelling is improved  , non tender. 

Normal lochia , She is breastfeeding.


U/o yesterday was 6000ml yesterday 





Functional Status: Reports: Pain Controlled, Tolerating Diet, Ambulating, 

Urinating





- Review of Systems


General: Reports: No Symptoms


HEENT: Reports: No Symptoms


Pulmonary: Reports: No Symptoms


Cardiovascular: Reports: No Symptoms


Gastrointestinal: Reports: No Symptoms


Genitourinary: Reports: No Symptoms


Musculoskeletal: Reports: No Symptoms


Skin: Reports: No Symptoms


Neurological: Reports: No Symptoms


Psychiatric: Reports: No Symptoms





- General Info


Date of Service: 18





- Patient Data


Vital Signs - Most Recent: 


 Last Vital Signs











Temp  36.5 C   18 07:31


 


Pulse  95   18 07:31


 


Resp  16   18 07:31


 


BP  115/89   18 07:31


 


Pulse Ox  96   18 07:31











Weight - Most Recent: 92.249 kg


I&O - Last 24 Hours: 


 Intake & Output











 18





 22:59 06:59 14:59


 


Intake Total 300 1000 


 


Output Total 1400 1600 


 


Balance -1100 -600 











Lab Results - Last 24 Hours: 


 Laboratory Results - last 24 hr











  18 Range/Units





  10:15 10:15 19:08 


 


Sodium  142   143  (136-145)  mmol/L


 


Potassium  4.1   4.0  (3.5-5.1)  mmol/L


 


Chloride  110 H   111 H  ()  mmol/L


 


Carbon Dioxide  23.5   26.8  (21.0-32.0)  mmol/L


 


BUN  18   16  (7.0-18.0)  mg/dL


 


Creatinine  1.2 H   1.0  (0.6-1.0)  mg/dL


 


Est Cr Clr Drug Dosing  55.29   66.35  mL/min


 


Estimated GFR (MDRD)  50.3   > 60.0  ml/min


 


Glucose  80   92  ()  mg/dL


 


Calcium  7.8 L   8.2 L  (8.5-10.1)  mg/dL


 


Phosphorus   4.2  4.1  (2.6-4.7)  mg/dL











Med Orders - Current: 


 Current Medications





Al Hydroxide/Mg Hydroxide (Mag-Al Plus)  30 ml PO Q8H PRN


   PRN Reason: Heartburn


Bisacodyl (Dulcolax)  10 mg RECTAL .ONCE PRN


   PRN Reason: Constipation


Carboprost Tromethamine (Hemabate Ds)  250 mcg IM ASDIRECTED PRN


   PRN Reason: Post Partum Hemorrhage


Diphenhydramine HCl (Benadryl)  25 mg IVPUSH Q6H PRN


   PRN Reason: Itching or Nausea


Docusate Sodium (Colace)  100 mg PO BID [facility]


   Last Admin: 18 20:20 Dose:  100 mg


Emollient Ointment (Lansinoh Hpa)  0 gm TOP ASDIRECTED PRN


   PRN Reason: Sore Nipples


Furosemide (Lasix)  20 mg PO DAILY [facility]


   Last Admin: 18 08:43 Dose:  20 mg


Lactated Ringer's (Ringers, Lactated)  1,000 mls @ 150 mls/hr IV ASDIRECTED [facility]


   Last Admin: 18 18:40 Dose:  150 mls/hr


Tranexamic Acid 1,000 mg/ (Sodium Chloride)  110 mls @ 660 mls/hr IV ONETIME PRN


   PRN Reason: Bleeding


Oxytocin/Sodium Chloride (Oxytocin 30 Unit/500 Ml-Ns)  30 unit in 500 mls @ 2 

mls/hr IV TITRATE [facility]; Protocol


   Last Titration: 18 17:26 Dose:  0 munits/min, 0 mls/hr


Lactated Ringer's (Ringers, Lactated)  1,000 mls @ 125 mls/hr IV ASDIRECTED [facility]


   Last Admin: 18 22:02 Dose:  125 mls/hr


Sodium Chloride (Normal Saline)  300 mls @ 999 mls/min IV .BOLUS [facility]


   Last Admin: 18 14:10 Dose:  999 mls/min


Ibuprofen (Motrin)  800 mg PO Q8H PRN


   PRN Reason: mild pain or fever


   Last Admin: 18 04:06 Dose:  800 mg


Methylergonovine Maleate (Methergine)  0.2 mg IM ASDIRECTED PRN


   PRN Reason: Post Partum Hemorrhage


Misoprostol (Cytotec)  200 mcg PO .ONCE PRN


   PRN Reason: Post Partum Hemorrhage


Ondansetron HCl (Zofran)  4 mg IVPUSH Q6H PRN


   PRN Reason: Nausea/Vomiting


   Last Admin: 18 07:03 Dose:  4 mg


Ondansetron HCl (Zofran)  4 mg IVPUSH Q4H PRN


   PRN Reason: Nausea/Vomiting


Oxycodone/Acetaminophen (Percocet 325-5 Mg)  1 tab PO Q4H PRN


   PRN Reason: Pain (moderate 4-6)


   Last Admin: 18 00:27 Dose:  1 tab


Oxycodone/Acetaminophen (Percocet 325-5 Mg)  2 tab PO Q4H PRN


   PRN Reason: Pain (moderate 4-6)


Promethazine HCl (Phenergan)  25 mg IM Q6H PRN


   PRN Reason: Nausea


   Last Admin: 18 08:42 Dose:  25 mg


Simethicone (Simethicone)  80 mg PO Q4H PRN


   PRN Reason: Gas


Sodium Chloride (Saline Flush)  10 ml FLUSH ASDIRECTED PRN


   PRN Reason: Keep Vein Open


Sodium Chloride (Saline Flush)  2.5 ml FLUSH ASDIRECTED PRN


   PRN Reason: Keep Vein Open


Terbutaline Sulfate (Brethine)  0.25 mg SUBCUT ASDIRECTED PRN


   PRN Reason: Tacysystole


   Last Admin: 18 19:14 Dose:  0.25 mg





Discontinued Medications





Bupivacaine HCl (Sensorcaine-Mpf 0.5%) Confirm Administered Dose 10 ml .ROUTE 

.STK-MED ONE


   Stop: 18 08:10


Bupivacaine HCl (Sensorcaine-Mpf 0.5%) Confirm Administered Dose 10 ml .ROUTE 

.STK-MED ONE


   Stop: 18 11:47


Bupivacaine HCl (Sensorcaine-Mpf 0.25%) Confirm Administered Dose 10 ml .ROUTE 

.STK-MED ONE


   Stop: 18 16:20


Butorphanol Tartrate (Stadol)  1 mg IVPUSH Q1H PRN


   PRN Reason: Pain


   Last Admin: 18 16:06 Dose:  1 mg


Cefazolin Sodium (Ancef) Confirm Administered Dose 2 gm .ROUTE .STK-MED ONE


   Stop: 18 19:25


Citric Acid/Sodium Citrate (Bicitra Solution)  30 ml PO ONETIME ONE


   Stop: 18 08:49


   Last Admin: 18 19:28 Dose:  30 ml


Citric Acid/Sodium Citrate (Bicitra Solution) Confirm Administered Dose 30 ml 

.ROUTE .STK-MED ONE


   Stop: 18 19:28


Fentanyl (Sublimaze) Confirm Administered Dose 100 mcg .ROUTE .STK-MED ONE


   Stop: 18 13:22


Fentanyl (Sublimaze) Confirm Administered Dose 100 mcg .ROUTE .STK-MED ONE


   Stop: 18 13:38


Fentanyl (Sublimaze) Confirm Administered Dose 100 mcg .ROUTE .STK-MED ONE


   Stop: 18 19:24


Furosemide (Lasix)  10 mg IVPUSH NOW ONE


   Stop: 18 20:44


   Last Admin: 18 21:16 Dose:  10 mg


Ampicillin Sodium 2 gm/ Sodium (Chloride)  100 mls @ 200 mls/hr IV ONETIME ONE


   Stop: 18 11:28


   Last Admin: 18 11:39 Dose:  200 mls/hr


Ampicillin Sodium 1 gm/ Sodium (Chloride)  50 mls @ 100 mls/hr IV Q4H [facility]


   Last Admin: 18 17:20 Dose:  100 mls/hr


Fentanyl/Bupivacaine HCl (Fentanyl-Bupiv-Ns 2 Mcg/Ml-0.125%) Confirm 

Administered Dose 100 mls @ as directed EP .STK-MED ONE


   Stop: 18 22:00


Fentanyl/Bupivacaine HCl (Fentanyl-Bupiv-Ns 2 Mcg/Ml-0.125%) Confirm 

Administered Dose 100 mls @ as directed EP .STK-MED ONE


   Stop: 18 06:51


Fentanyl/Bupivacaine HCl (Fentanyl-Bupiv-Ns 2 Mcg/Ml-0.125%) Confirm 

Administered Dose 100 mls @ as directed EP .STK-MED ONE


   Stop: 18 12:01


Bupivacaine HCl/Epinephrine Bitart (Sensorc Mpf 0.25%-Epi 1:[id number]) Confirm 

Administered Dose 30 mls @ as directed .ROUTE .STK-MED ONE


   Stop: 18 20:15


Ketorolac Tromethamine (Toradol)  30 mg IVPUSH Q6H [facility]


   Stop: 18 22:01


   Last Admin: 18 22:35 Dose:  30 mg


Lidocaine HCl (Xylocaine 1%)  50 ml INJECT .ONCE PRN


   PRN Reason: Laceration repair


Misoprostol (Cytotec)  25 mcg VAG .ONCE [facility]


   Last Admin: 18 11:32 Dose:  25 mcg


Misoprostol (Cytotec)  25 mcg VAG Q4H PRN


   PRN Reason: Cervical Ripening


   Last Admin: 18 21:30 Dose:  25 mcg


Morphine Sulfate (Duramorph Pf) Confirm Administered Dose 1 mg .ROUTE .STK-MED 

ONE


   Stop: 18 19:24


Nalbuphine HCl (Nubain)  10 mg IVPUSH Q1H PRN


   PRN Reason: Pain (severe 7-10)


Nalbuphine HCl (Nubain)  10 mg IVPUSH Q3H PRN


   PRN Reason: Pruritis


   Stop: 18 20:51


Naloxone HCl (Narcan)  0.1 mg IVPUSH ONETIME PRN


   PRN Reason: Respiratory Depression


   Stop: 18 20:51


Ondansetron HCl (Zofran) Confirm Administered Dose 4 mg .ROUTE .STK-MED ONE


   Stop: 18 19:37


Oxytocin (Pitocin) Confirm Administered Dose 20 unit .ROUTE .STK-MED ONE


   Stop: 18 19:37


Phenylephrine HCl (Phenylephrine In Ns 100 Mcg/Ml) Confirm Administered Dose 1 

mg .ROUTE .STK-MED ONE


   Stop: 18 19:37


Propofol (Diprivan  20 Ml) Confirm Administered Dose 200 mg .ROUTE .STK-MED ONE


   Stop: 18 19:38


Ropivacaine (Naropin 0.2%) Confirm Administered Dose 20 ml .ROUTE .STK-MED ONE


   Stop: 18 13:22


Sterile Water (Sterile Water For Irrigation)  1,000 ml IRR ASDIRECTED PRN


   PRN Reason: delivery


Succinylcholine Chloride (Quelicin) Confirm Administered Dose 200 mg .ROUTE .STK

-MED ONE


   Stop: 18 20:07











- Infant Interaction


Infant Disposition, Postpartum:  in Room with Family


Infant Feeding:  Infant; Nursed Well


Support Person: 





- Postpartum Recovery Exam


Fundal Tone: Firm


Fundal Level: At Umbilicus


Fundal Placement: Midline


Lochia Amount: Small


Lochia Color: Rubra/Red


Perineum Description: Intact, Minimal Bruising/Swelling


Episiotomy/Laceration: Approximated


Bladder Status: Voiding


Urinary Elimination: Voided





- Exam


General: Alert, Oriented


HEENT: Pupils Equal


Neck: Supple


Lungs: Clear to Auscultation, Normal Respiratory Effort


Cardiovascular: Regular Rate, Regular Rhythm


GI/Abdominal Exam: Normal Bowel Sounds (pfannestiel skin incision with 

steristrips in place c/d/i)


Extremities: Normal Inspection


Skin: Warm


Wound/Incisions: Healing Well


Neurological: No New Focal Deficit


Psy/Mental Status: Alert





- Problem List & Annotations


(1)  delivery, delivered, current hospitalization


SNOMED Code(s): 344344780


   Code(s): O82 - ENCOUNTER FOR  DELIVERY WITHOUT INDICATION   Status: 

Acute   Current Visit: Yes   





- Problem List Review


Problem List Initiated/Reviewed/Updated: Yes





- My Orders


Last 24 Hours: 


My Active Orders





18 08:49


SHY Hose [Antiembolic Hose] [OM.PC] Routine 





18 14:00


Sodium Chloride 0.9% [Normal Saline] 300 ml IV .BOLUS 





18 08:00


BASIC METABOLIC PANEL,BMP [CHEM] Routine 


PHOSPHORUS [CHEM] Routine 














- Assessment


Assessment:: 





POD 3 status post Primary LTCS, Fluid retention  





- Plan


Plan:: 


Will repeat BMP this AM


Will discharge home yajaira lynne